# Patient Record
Sex: FEMALE | Race: OTHER
[De-identification: names, ages, dates, MRNs, and addresses within clinical notes are randomized per-mention and may not be internally consistent; named-entity substitution may affect disease eponyms.]

---

## 2018-01-14 ENCOUNTER — HOSPITAL ENCOUNTER (EMERGENCY)
Dept: HOSPITAL 62 - ER | Age: 28
LOS: 1 days | Discharge: HOME | End: 2018-01-15
Payer: SELF-PAY

## 2018-01-14 VITALS — DIASTOLIC BLOOD PRESSURE: 97 MMHG | SYSTOLIC BLOOD PRESSURE: 150 MMHG

## 2018-01-14 DIAGNOSIS — R10.2: ICD-10-CM

## 2018-01-14 DIAGNOSIS — M25.511: ICD-10-CM

## 2018-01-14 DIAGNOSIS — R10.9: Primary | ICD-10-CM

## 2018-01-14 DIAGNOSIS — M79.601: ICD-10-CM

## 2018-01-14 PROCEDURE — 84703 CHORIONIC GONADOTROPIN ASSAY: CPT

## 2018-01-14 PROCEDURE — 99284 EMERGENCY DEPT VISIT MOD MDM: CPT

## 2018-01-14 PROCEDURE — 76830 TRANSVAGINAL US NON-OB: CPT

## 2018-01-14 PROCEDURE — 87591 N.GONORRHOEAE DNA AMP PROB: CPT

## 2018-01-14 PROCEDURE — 87491 CHLMYD TRACH DNA AMP PROBE: CPT

## 2018-01-14 PROCEDURE — 81001 URINALYSIS AUTO W/SCOPE: CPT

## 2018-01-14 PROCEDURE — 36415 COLL VENOUS BLD VENIPUNCTURE: CPT

## 2018-01-14 PROCEDURE — 80053 COMPREHEN METABOLIC PANEL: CPT

## 2018-01-14 PROCEDURE — 85025 COMPLETE CBC W/AUTO DIFF WBC: CPT

## 2018-01-15 LAB
ADD MANUAL DIFF: NO
ALBUMIN SERPL-MCNC: 4.4 G/DL (ref 3.5–5)
ALP SERPL-CCNC: 88 U/L (ref 38–126)
ALT SERPL-CCNC: 25 U/L (ref 9–52)
ANION GAP SERPL CALC-SCNC: 14 MMOL/L (ref 5–19)
APPEARANCE UR: CLEAR
APTT PPP: YELLOW S
AST SERPL-CCNC: 19 U/L (ref 14–36)
BASOPHILS # BLD AUTO: 0 10^3/UL (ref 0–0.2)
BASOPHILS NFR BLD AUTO: 0.5 % (ref 0–2)
BILIRUB DIRECT SERPL-MCNC: 0.2 MG/DL (ref 0–0.4)
BILIRUB SERPL-MCNC: 0.3 MG/DL (ref 0.2–1.3)
BILIRUB UR QL STRIP: NEGATIVE
BUN SERPL-MCNC: 13 MG/DL (ref 7–20)
CALCIUM: 9.6 MG/DL (ref 8.4–10.2)
CHLAM PCR: NOT DETECTED
CHLORIDE SERPL-SCNC: 103 MMOL/L (ref 98–107)
CO2 SERPL-SCNC: 26 MMOL/L (ref 22–30)
EOSINOPHIL # BLD AUTO: 0.3 10^3/UL (ref 0–0.6)
EOSINOPHIL NFR BLD AUTO: 2.7 % (ref 0–6)
ERYTHROCYTE [DISTWIDTH] IN BLOOD BY AUTOMATED COUNT: 15.4 % (ref 11.5–14)
GLUCOSE SERPL-MCNC: 86 MG/DL (ref 75–110)
GLUCOSE UR STRIP-MCNC: NEGATIVE MG/DL
GON PCR: NOT DETECTED
HCT VFR BLD CALC: 36.7 % (ref 36–47)
HGB BLD-MCNC: 12.1 G/DL (ref 12–15.5)
KETONES UR STRIP-MCNC: NEGATIVE MG/DL
LYMPHOCYTES # BLD AUTO: 3.8 10^3/UL (ref 0.5–4.7)
LYMPHOCYTES NFR BLD AUTO: 37.3 % (ref 13–45)
MCH RBC QN AUTO: 24.8 PG (ref 27–33.4)
MCHC RBC AUTO-ENTMCNC: 32.9 G/DL (ref 32–36)
MCV RBC AUTO: 76 FL (ref 80–97)
MONOCYTES # BLD AUTO: 0.8 10^3/UL (ref 0.1–1.4)
MONOCYTES NFR BLD AUTO: 7.4 % (ref 3–13)
NEUTROPHILS # BLD AUTO: 5.3 10^3/UL (ref 1.7–8.2)
NEUTS SEG NFR BLD AUTO: 52.1 % (ref 42–78)
NITRITE UR QL STRIP: NEGATIVE
PH UR STRIP: 7 [PH] (ref 5–9)
PLATELET # BLD: 214 10^3/UL (ref 150–450)
POTASSIUM SERPL-SCNC: 3.6 MMOL/L (ref 3.6–5)
PROT SERPL-MCNC: 7.5 G/DL (ref 6.3–8.2)
PROT UR STRIP-MCNC: NEGATIVE MG/DL
RBC # BLD AUTO: 4.86 10^6/UL (ref 3.72–5.28)
SODIUM SERPL-SCNC: 142.6 MMOL/L (ref 137–145)
SP GR UR STRIP: 1.01
TOTAL CELLS COUNTED % (AUTO): 100 %
UROBILINOGEN UR-MCNC: NEGATIVE MG/DL (ref ?–2)
WBC # BLD AUTO: 10.2 10^3/UL (ref 4–10.5)

## 2018-01-15 NOTE — ER DOCUMENT REPORT
ED Medical Screen (RME)





- General


Chief Complaint: Abdominal Pain


Stated Complaint: LUMP ON ARMPIT


Time Seen by Provider: 01/15/18 00:27


Mode of Arrival: Ambulatory


Information source: Patient


Notes: 





Patient presents complaining of pain to right axilla with swelling for the past 

month.  Patient reports right upper back and neck pain for the past week with 

pain into the right arm for the past 3 days.  Patient additionally reports 

right lower quadrant abdominal pain with some swelling for the past few months 

that is started to worsen.  Patient denies any urinary symptoms.  Patient 

denies any nausea, vomiting or diarrhea.  Patient denies any concerns about 

pregnancy.








I have greeted and performed a rapid initial assessment of this patient.  A 

comprehensive ED assessment and evaluation of the patient, analysis of test 

results and completion of the medical decision making process will be conducted 

by additional ED providers.


TRAVEL OUTSIDE OF THE [place] IN LAST 30 DAYS: No





- Related Data


Allergies/Adverse Reactions: 


 





No Known Drug Allergies Allergy (Verified 01/14/18 22:43)


 











Physical Exam





- Vital signs


Vitals: 





 











Temp Pulse Resp BP Pulse Ox


 


 98.4 F   76   16   150/97 H  100 


 


 01/14/18 22:43  01/14/18 22:43  01/14/18 22:43  01/14/18 22:43  01/14/18 22:43














- Abdominal


Tenderness: Tender - Right lower pelvic tenderness





Course





- Vital Signs


Vital signs: 





 











Temp Pulse Resp BP Pulse Ox


 


 98.4 F   76   16   150/97 H  100 


 


 01/14/18 22:43  01/14/18 22:43  01/14/18 22:43  01/14/18 22:43  01/14/18 22:43

## 2018-01-15 NOTE — RADIOLOGY REPORT (SQ)
EXAM DESCRIPTION: SHOULDER RIGHT 2 OR MORE VIEWS



CLINICAL HISTORY:  right shoulder pain



COMPARISON: None.



FINDINGS: 3 views of the right shoulder. No acute fracture or

dislocation. Normal osseous mineralization. No widening of the

acromioclavicular joint. Right ribs appear intact. No right-sided

pneumothorax.



IMPRESSION:

No acute fracture or dislocation.

## 2018-01-15 NOTE — ER DOCUMENT REPORT
ED General





- General


Chief Complaint: Abdominal Pain


Stated Complaint: ABDOMINAL PAIN


Time Seen by Provider: 01/15/18 00:27


Mode of Arrival: Ambulatory


Notes: 


Patient is a 27-year-old female presents with complaint of this to try to 

obtain a history review of family however I felt that interpretation was not 

adequate and therefore I did read obtain a history using the [employer]  

services.  I was assisted by  number [id number].  Patient's first 

complaint is of pain in the right pelvic region.  She says that she still feels 

as if there is a small area that swells the right pelvic region just before..  

This is been ongoing for several months.  No abnormal vaginal bleeding.  No 

abnormal discharge.  No fevers.  No infections.  He does follow with a OB/GYN 

regularly but has not mentioned this to her.  Does have a history of a C-

section was performed 10 years ago.  Patient second complaint is of a pain into 

her right shoulder.  She says she feels as if there is a small knot into her 

axilla.  She has pain is sometimes radiate up and down her arm.  She denies any 

pain or swelling to the breast.  She denies any lumps or masses to the breast.  

No redness to the breast.  She has not mentioned this to her women's health 

doctor either.  Patient says that her pain is worse when she moves her shoulder 

rotates her shoulder and sometimes it causes pain that radiates down her upper 

arm.  No swelling into the arm.  No other complaints at this time.





TRAVEL OUTSIDE OF THE [place] IN LAST 30 DAYS: No





- Related Data


Allergies/Adverse Reactions: 


 





No Known Drug Allergies Allergy (Verified 01/14/18 22:43)


 











Past Medical History





- General


Information source: Patient





- Social History


Smoking Status: Never Smoker


Frequency of alcohol use: None


Drug Abuse: None


Family History: Reviewed & Not Pertinent


Patient has suicidal ideation: No


Patient has homicidal ideation: No


Renal/ Medical History: Denies: Hx Peritoneal Dialysis





Review of Systems





- Review of Systems


Notes: 


My Normal Review Basic





REVIEW OF SYSTEMS:


CONSTITUTIONAL :  Denies fever,  chills, or sweats.  Denies recent illness.


EENT:   Denies eye, ear, throat, or mouth pain or symptoms.  Denies nasal or 

sinus congestion.


CARDIOVASCULAR:  Denies chest pain.


RESPIRATORY:  Denies cough, cold, or chest congestion.  Denies shortness of 

breath, difficulty breathing, or wheezing.


GASTROINTESTINAL:  Denies abdominal pain.  Denies nausea, vomiting, or 

diarrhea.  Denies constipation.  Last BM: 


GENITOURINARY:  Denies difficulty urinating, painful urination, burning, 

frequency, or blood in urine.


FEMALE  GENITOURINARY: Pain in the right lower quadrant abdomen occurs at the 

beginning of her menstrual periods.  No abnormal vaginal discharge or bleeding.


MUSCULOSKELETAL: Some pain into the right axilla and into the right shoulder 

and on the right arm.


SKIN:   Denies rash or skin lesions.


NEUROLOGICAL:  Denies altered mental status or loss of consciousness.  Denies 

headache.  Denies weakness or paralysis or loss of use of either side.  Denies 

problems with gait or speech.  Denies sensory or motor loss.


ALL OTHER SYSTEMS REVIEWED AND NEGATIVE.








Physical Exam





- Vital signs


Vitals: 


 











Temp Pulse Resp BP Pulse Ox


 


 98.4 F   76   16   150/97 H  100 


 


 01/14/18 22:43  01/14/18 22:43  01/14/18 22:43  01/14/18 22:43  01/14/18 22:43














- Notes


Notes: 





General Appearance: Well nourished, alert, cooperative, no acute distress, no 

obvious discomfort.  Well appearing.


Vitals: reviewed, See vital signs table.


Head: no swelling or tenderness to the head


Eyes: PERRL, EOMI, Conjuctiva clear


Mouth: No decreasd moisture


Lungs: No wheezing, No rales, No rhonci, No accessory muscle use, good air 

exchange bilaterally.


Heart: Normal rate, Regular rythm


Abdomen: Normal BS, soft, No rigidity, very minimal pain to palpation of the 

right pelvic region.  I do not feel any knots or area of swelling.  I do not 

feel anything consistent with a hernia., No guarding, no rebound, no abdominal 

masses, no organomegaly


Extremities: strength 5/5 in all extremities, good pulses in all extremities, 

no palpable lymphadenopathy in the right axilla.  She does have any redness or 

swelling to right axilla.  She does have full range of motion of the right 

shoulder but has some pain in doing so.


Skin: warm, dry, appropriate color, no rash


Neuro: speech clear, oriented x 3, normal affect, responds appropriately to 

questions.





Course





- Re-evaluation


Re-evalutation: 





01/15/18 05:04


I did review the patient's lab results and test results with the patient using 

the .  I also reviewed plan and follow-up with the patient and jher 

family.  I used the [employer]  service with  number [id number].

  Patient is well-appearing though she is safe to be discharged home.  Her 

right shoulder pain seems to be more muscular skeletal fluid it is worse with 

range of motion.  She does complain of feeling she has some swelling in the 

axilla.  I do not appreciate any swelling in the axilla or abnormal 

lymphadenopathy.  She has not had any lumps or bumps in her breast area.  I 

informed her it still important she has yearly breast exams by .  She says 

she does have a doctor that she goes and sees on a regular basis.  Also 

informed her to return to ER if she has any redness or swelling or 

lymphadenopathy that is large in the axilla.  Also encouraged her follow-up 

with her doctor for reevaluation of her shoulder and further workup as needed.  

Patient has pain in the right lower pelvis and lower abdomen.  The pain is 

worse whenever she is that of her menstrual period.  I did do ultrasound shows 

no evidence of ovarian cyst or fibroids.  I talked to patient about the 

possibility of endometriosis being that her pain is cyclical.  She also felt as 

if she has a knot in this area as well.  I did very deep palpation all 

throughout her inguinal area and right lower quadrant.  I cannot feel a knot or 

mass.  Nothing suggestive of a hernia.  I encouraged her follow-up closely with 

her primary care doctor for evaluation of this and also talked about follow-up 

with her gynecologist for evaluation of endometriosis if she continues to have 

this pain that is associated with her menstrual cycles.  Patient encouraged to 

return to ER if she has severe pain, vomiting, fevers, or feels unwell.  

Patient agrees with plan and will be discharged home.





Dictation of this chart was performed using voice recognition software; 

therefore, there may be some unintended grammatical errors.





- Vital Signs


Vital signs: 


 











Temp Pulse Resp BP Pulse Ox


 


 98.4 F   76   16   150/97 H  100 


 


 01/14/18 22:43  01/14/18 22:43  01/14/18 22:43  01/14/18 22:43  01/14/18 22:43














- Laboratory


Result Diagrams: 


 01/15/18 01:35





 01/15/18 01:35


Laboratory results interpreted by me: 


 











  01/15/18 01/15/18





  01:35 01:35


 


MCV  76 L 


 


MCH  24.8 L 


 


RDW  15.4 H 


 


Creatinine   0.51 L














Discharge





- Discharge


Clinical Impression: 


Abdominal pain


Qualifiers:


 Abdominal location: unspecified location Qualified Code(s): R10.9 - 

Unspecified abdominal pain





Shoulder pain, right


Qualifiers:


 Chronicity: unspecified Qualified Code(s): M25.511 - Pain in right shoulder





Condition: Good


Disposition: HOME, SELF-CARE


Additional Instructions: 


We performed an x-ray of the shoulder.  There are no signs of injury to the 

bone that make up the shoulder.  Being that you have worsening pain with 

movement of the shoulder you may have a shoulder strain.  If this continues may 

eventually need an MRI of your shoulder.  I did not palpate any enlarged lymph 

nodes in your axilla.  It is important that if you start to have swelling or 

enlarged lymph nodes in the armpit that you follow-up closely with your doctor 

so they can reevaluate.  You should have yearly breast exams performed.  We 

also performed an ultrasound.  No evidence of ovarian cyst or fibroid tumors.  

You have right-sided pelvic pain that is worse in conjunction with her cycles.  

This sometimes can suggest endometriosis.  Please follow-up with your 

gynecologist in regards to further workup and treatment of your current pain.  

If you develop swelling or redness to the right pelvic area you should return 

to the ER for evaluation.  Please return if you have fevers or hard knots of 

your right lower abdomen that will not go away.

## 2018-01-15 NOTE — RADIOLOGY REPORT (SQ)
EXAM DESCRIPTION: U/S NON-OB PELVIS TV W/O DOP



CLINICAL HISTORY: 27 years Female, right sided pelvic pain



COMPARISON: None.



TECHNIQUE: Complete pelvic ultrasound with transvaginal imaging.



FINDINGS: 



The uterus measures 10.4 x 5.4 x 6.6 cm. Endometrial thickness of

0.5 cm. Cervical length of 3.5 cm. No myometrial abnormalities.



No free pelvic fluid.



The right ovary measures 4.5 x 2.9 x 2.6 cm. The left ovary

measures 3.7 x 2.6 x 2.6 cm. Limited color and spectral Doppler

imaging of the ovaries demonstrates flow bilaterally.



IMPRESSION:



1. No sonographic abnormality in the pelvis identified.

.

## 2018-03-03 ENCOUNTER — HOSPITAL ENCOUNTER (EMERGENCY)
Dept: HOSPITAL 62 - ER | Age: 28
Discharge: HOME | End: 2018-03-03
Payer: SELF-PAY

## 2018-03-03 VITALS — DIASTOLIC BLOOD PRESSURE: 78 MMHG | SYSTOLIC BLOOD PRESSURE: 131 MMHG

## 2018-03-03 DIAGNOSIS — R10.13: Primary | ICD-10-CM

## 2018-03-03 DIAGNOSIS — R11.10: ICD-10-CM

## 2018-03-03 DIAGNOSIS — R30.0: ICD-10-CM

## 2018-03-03 LAB
ADD MANUAL DIFF: NO
ALBUMIN SERPL-MCNC: 4.6 G/DL (ref 3.5–5)
ALP SERPL-CCNC: 107 U/L (ref 38–126)
ALT SERPL-CCNC: 31 U/L (ref 9–52)
ANION GAP SERPL CALC-SCNC: 9 MMOL/L (ref 5–19)
APPEARANCE UR: CLEAR
APTT PPP: YELLOW S
AST SERPL-CCNC: 22 U/L (ref 14–36)
BASOPHILS # BLD AUTO: 0 10^3/UL (ref 0–0.2)
BASOPHILS NFR BLD AUTO: 0.3 % (ref 0–2)
BILIRUB DIRECT SERPL-MCNC: 0.3 MG/DL (ref 0–0.4)
BILIRUB SERPL-MCNC: 0.7 MG/DL (ref 0.2–1.3)
BILIRUB UR QL STRIP: NEGATIVE
BUN SERPL-MCNC: 12 MG/DL (ref 7–20)
CALCIUM: 9.2 MG/DL (ref 8.4–10.2)
CHLORIDE SERPL-SCNC: 104 MMOL/L (ref 98–107)
CO2 SERPL-SCNC: 25 MMOL/L (ref 22–30)
EOSINOPHIL # BLD AUTO: 0 10^3/UL (ref 0–0.6)
EOSINOPHIL NFR BLD AUTO: 0.2 % (ref 0–6)
ERYTHROCYTE [DISTWIDTH] IN BLOOD BY AUTOMATED COUNT: 15.4 % (ref 11.5–14)
GLUCOSE SERPL-MCNC: 100 MG/DL (ref 75–110)
GLUCOSE UR STRIP-MCNC: NEGATIVE MG/DL
HCT VFR BLD CALC: 37.4 % (ref 36–47)
HGB BLD-MCNC: 12.2 G/DL (ref 12–15.5)
KETONES UR STRIP-MCNC: NEGATIVE MG/DL
LIPASE SERPL-CCNC: 82.2 U/L (ref 23–300)
LYMPHOCYTES # BLD AUTO: 1.5 10^3/UL (ref 0.5–4.7)
LYMPHOCYTES NFR BLD AUTO: 20.7 % (ref 13–45)
MCH RBC QN AUTO: 24.6 PG (ref 27–33.4)
MCHC RBC AUTO-ENTMCNC: 32.7 G/DL (ref 32–36)
MCV RBC AUTO: 75 FL (ref 80–97)
MONOCYTES # BLD AUTO: 0.6 10^3/UL (ref 0.1–1.4)
MONOCYTES NFR BLD AUTO: 8.5 % (ref 3–13)
NEUTROPHILS # BLD AUTO: 5.1 10^3/UL (ref 1.7–8.2)
NEUTS SEG NFR BLD AUTO: 70.3 % (ref 42–78)
NITRITE UR QL STRIP: NEGATIVE
PH UR STRIP: 7 [PH] (ref 5–9)
PLATELET # BLD: 208 10^3/UL (ref 150–450)
POTASSIUM SERPL-SCNC: 3.8 MMOL/L (ref 3.6–5)
PROT SERPL-MCNC: 8.2 G/DL (ref 6.3–8.2)
PROT UR STRIP-MCNC: NEGATIVE MG/DL
RBC # BLD AUTO: 4.97 10^6/UL (ref 3.72–5.28)
SODIUM SERPL-SCNC: 137.8 MMOL/L (ref 137–145)
SP GR UR STRIP: 1.01
TOTAL CELLS COUNTED % (AUTO): 100 %
UROBILINOGEN UR-MCNC: NEGATIVE MG/DL (ref ?–2)
WBC # BLD AUTO: 7.3 10^3/UL (ref 4–10.5)

## 2018-03-03 PROCEDURE — 99283 EMERGENCY DEPT VISIT LOW MDM: CPT

## 2018-03-03 PROCEDURE — 96374 THER/PROPH/DIAG INJ IV PUSH: CPT

## 2018-03-03 PROCEDURE — 81001 URINALYSIS AUTO W/SCOPE: CPT

## 2018-03-03 PROCEDURE — 83690 ASSAY OF LIPASE: CPT

## 2018-03-03 PROCEDURE — 96361 HYDRATE IV INFUSION ADD-ON: CPT

## 2018-03-03 PROCEDURE — 80053 COMPREHEN METABOLIC PANEL: CPT

## 2018-03-03 PROCEDURE — 36415 COLL VENOUS BLD VENIPUNCTURE: CPT

## 2018-03-03 PROCEDURE — 85025 COMPLETE CBC W/AUTO DIFF WBC: CPT

## 2018-03-03 PROCEDURE — 81025 URINE PREGNANCY TEST: CPT

## 2018-03-03 NOTE — ER DOCUMENT REPORT
ED General





- General


Chief Complaint: Vomiting


Stated Complaint: FEVER,VOMITING


Time Seen by Provider: 18 01:49


Notes: 





Patient is a 27-year-old female without past medical history,  sections 

no additional past surgical history, who presents with 24 hours of epigastric 

abdominal pain and vomiting.  Patient states that at approximately 3 AM the 

morning, she woke up with an epigastric abdominal pain that she describes as a 

dull, aching, constant pain.  She notes that vomiting makes the pain worse.  

Nothing improves the pain.  She denies any history of similar symptoms in the 

past.  No known sick contacts.  She denies any diarrhea, cough, sputum 

production, headache, neck pain, or lower abdominal pain.  She has passed 

flatus and had a bowel movement today without difficulty.  She has not seen her 

primary care doctor regarding today's concerns.





The history and physical exam was obtained by the provider using [place]ian. A 

formal hospital  was offered to the patient and any family at the 

bedside at the beginning of the encounter and was declined.


TRAVEL OUTSIDE OF THE [place] IN LAST 30 DAYS: No





- Related Data


Allergies/Adverse Reactions: 


 





No Known Drug Allergies Allergy (Verified 18 02:19)


 











Past Medical History





- General


Information source: Patient





- Social History


Smoking Status: Never Smoker


Cigarette use (# per day): No


Frequency of alcohol use: None


Drug Abuse: None


Lives with: Spouse/Significant other


Family History: Reviewed & Not Pertinent


Renal/ Medical History: Denies: Hx Peritoneal Dialysis





Review of Systems





- Review of Systems


Notes: 





Constitutional: Positive for subjective fever


HENT: Negative for sore throat.


Eyes: Negative for visual changes.


Cardiovascular: Negative for chest pain.


Respiratory: Negative for shortness of breath.


Gastrointestinal: Positive for abdominal pain and vomiting


Genitourinary: Negative for dysuria.


Musculoskeletal: Negative for back pain.


Skin: Negative for rash.


Neurological: Negative for headaches, weakness or numbness.





10 point ROS negative except as marked above and in HPI.





Physical Exam





- Vital signs


Vitals: 


 











Temp Pulse Resp BP Pulse Ox


 


 100 F   98   18   139/84 H  97 


 


 18 01:18  18 01:18  18 01:18  18 01:18  18 01:18











Interpretation: Normal


Notes: 





PHYSICAL EXAMINATION:





GENERAL: Well-appearing, well-nourished and in no acute distress.





HEAD: Atraumatic, normocephalic.





EYES: Pupils equal round and reactive to light, extraocular movements intact, 

sclera anicteric, conjunctiva are normal.





ENT: nares patent, oropharynx clear without exudates.  Moderately dry mucous 

membranes.





NECK: Normal range of motion, supple without lymphadenopathy





LUNGS: Breath sounds clear to auscultation bilaterally and equal.  No wheezes 

rales or rhonchi.





HEART: Regular rate and rhythm without murmurs





ABDOMEN: Soft, mild epigastric abdominal tenderness to palpation but no other 

localized areas of tenderness, normoactive bowel sounds.  No guarding, no 

rebound.  No masses appreciated.





EXTREMITIES: Normal range of motion, no pitting or edema.  No cyanosis.





NEUROLOGICAL: No focal neurological deficits. Moves all extremities 

spontaneously and on command.





PSYCH: Normal mood, normal affect.





SKIN: Warm, Dry, normal turgor, no rashes or lesions noted.





Course





- Re-evaluation


Re-evalutation: 





18 02:00


Patient presents with report of subjective fever, obvious abdominal pain and 

vomiting.  Patient is overall very well in appearance, vitals within normal 

limits at time of my assessment, patient does appear mildly dehydrated but 

otherwise overall very well in appearance.  Abdominal examination patient has 

some mild epigastric abdominal discomfort but no other localized areas of 

tenderness.  Bedside right upper quadrant ultrasound does not demonstrate any 

evidence of acute cholecystitis though any gallbladder wall thickening, 

pericholecystic fluid or stones in the gallbladder.  Differential diagnosis 

includes an acute viral etiology, acute pancreatitis, acute hepatitis, or 

regionalized colitis.  Very low clinical suspicion for anything such as an 

acute appendicitis that she has no lower abdominal discomfort.  Patient does 

not have any CVA tenderness but does note some dysuria and pyelonephritis could 

account for both vomiting, subjective fever and dysuria.  Will proceed with labs

, IV rehydration, IV antiemetics and reassess the patient.


18 02:50


Labs are unremarkable, no additional vomiting, patient feels improved.  Repeat 

abdominal exam remains benign.  At this time the exact etiology of the patient'

s symptoms is uncertain but is likely secondary to a viral etiology.  At this 

time will discharge with return precautions and follow-up recommendations.  

Verbal discharge instructions given a the bedside and opportunity for questions 

given. Medication warnings reviewed. Patient is in agreement with this plan and 

has verbalized understanding of return precautions and the need for primary 

care follow-up in the next 24-72 hours.





- Vital Signs


Vital signs: 


 











Temp Pulse Resp BP Pulse Ox


 


 100 F   98   18   139/84 H  97 


 


 18 01:18  18 01:18  18 01:18  18 01:18  18 01:18














- Laboratory


Result Diagrams: 


 18 02:10





 18 02:10


Laboratory results interpreted by me: 


 











  18





  02:07 02:10


 


MCV   75 L


 


MCH   24.6 L


 


RDW   15.4 H


 


Urine Blood  SMALL H 


 


Ur Leukocyte Esterase  TRACE H 














Discharge





- Discharge


Clinical Impression: 


 Epigastric abdominal pain





Vomiting


Qualifiers:


 Vomiting type: unspecified Vomiting Intractability: non-intractable Nausea 

presence: with nausea Qualified Code(s): R11.2 - Nausea with vomiting, 

unspecified





Condition: Good


Disposition: HOME, SELF-CARE


Additional Instructions: 


You have been seen in the Emergency Department (ED)  today for nausea and 

vomiting.  Your work up today has not shown a clear cause for your symptoms.


You have been prescribed Zofran; please use as prescribed as needed for your 

nausea.





Follow up with your doctor as soon as possible regarding today's emergent visit 

and your symptoms of nausea. 





Return to the Emergency Department (ED)  if you develop abdominal pain, bloody 

vomiting, bloody diarrhea, if you are unable to tolerate fluids due to vomiting

, or if you develop other symptoms that concern you.